# Patient Record
Sex: MALE | Race: WHITE | NOT HISPANIC OR LATINO | Employment: OTHER
[De-identification: names, ages, dates, MRNs, and addresses within clinical notes are randomized per-mention and may not be internally consistent; named-entity substitution may affect disease eponyms.]

---

## 2024-05-02 ENCOUNTER — TRANSCRIBE ORDERS (OUTPATIENT)
Dept: OTHER | Age: 84
End: 2024-05-02

## 2024-05-02 DIAGNOSIS — M70.62 TROCHANTERIC BURSITIS OF LEFT HIP: ICD-10-CM

## 2024-05-02 DIAGNOSIS — M54.50 ACUTE LEFT-SIDED LOW BACK PAIN WITHOUT SCIATICA: ICD-10-CM

## 2024-05-02 DIAGNOSIS — M53.3 SACROCOCCYGEAL DISORDERS, NOT ELSEWHERE CLASSIFIED: Primary | ICD-10-CM

## 2024-05-06 ENCOUNTER — THERAPY VISIT (OUTPATIENT)
Dept: PHYSICAL THERAPY | Facility: CLINIC | Age: 84
End: 2024-05-06
Attending: FAMILY MEDICINE
Payer: COMMERCIAL

## 2024-05-06 DIAGNOSIS — M53.3 SACROCOCCYGEAL DISORDERS, NOT ELSEWHERE CLASSIFIED: Primary | ICD-10-CM

## 2024-05-06 DIAGNOSIS — M70.62 TROCHANTERIC BURSITIS OF LEFT HIP: ICD-10-CM

## 2024-05-06 DIAGNOSIS — M54.50 ACUTE LEFT-SIDED LOW BACK PAIN WITHOUT SCIATICA: ICD-10-CM

## 2024-05-06 PROCEDURE — 97110 THERAPEUTIC EXERCISES: CPT | Mod: GP | Performed by: PHYSICAL THERAPIST

## 2024-05-06 PROCEDURE — 97161 PT EVAL LOW COMPLEX 20 MIN: CPT | Mod: GP | Performed by: PHYSICAL THERAPIST

## 2024-05-06 NOTE — PROGRESS NOTES
"PHYSICAL THERAPY EVALUATION  Type of Visit: Evaluation    See electronic medical record for Abuse and Falls Screening details.    Subjective       Presenting condition or subjective complaint: lower back (muscle) painPt has chronic history of low back pain. Reports that he's very sedentary and that sitting a long time and watching TV has \"taken its toll.\"  About two months ago (3/6/24) noticed more acute pain on the L side with walking. Acquired a cane. Struggling with standing and walking for more than 20-30 seconds. Saw negrita WILKINS with trochanteric bursitis, given prednisone which helped a bit, saw MD and referred to PT with dx of low back/sacral pain.     Date of onset: 03/06/24    Relevant medical history: Diabetes; High blood pressure; Pain at night or rest; Significant weakness (pre-diabetes)   Dates & types of surgery: L knee in 1970s, ablation in 2000s, R eye in 200s    Prior diagnostic imaging/testing results:       Prior therapy history for the same diagnosis, illness or injury: No      Prior Level of Function  Transfers:   Ambulation:   ADL:   IADL:     Living Environment  Social support: With a significant other or spouse   Type of home: House (split entry)   Stairs to enter the home: No       Ramp: No   Stairs inside the home: Yes 7 Is there a railing: Yes   Help at home: None  Equipment owned: Straight Cane     Employment: No retired  Hobbies/Interests:      Patient goals for therapy: walk and stand without pain    Pain assessment: See objective evaluation for additional pain details     Objective   LUMBAR SPINE EVALUATION  PAIN: Pain Level at Rest: 2/10  Pain Level with Use: 7/10  Pain Location: L low back/sacral area, B low back, sometimes to L thigh  Pain Quality: Aching  Pain Frequency: intermittent  Pain is Worst: activity based  Pain is Exacerbated By: standing/walking  Pain is Relieved By: sitting, prednisone  Pain Progression: Worsened  INTEGUMENTARY (edema, incisions):   POSTURE: Standing Posture: " Lordosis decreased  Sitting Posture: Lordosis decreased, Thoracic kyphosis increased  GAIT:   Weightbearing Status:   Assistive Device(s): Cane (single end), (tall cane)  Gait Deviations:   BALANCE/PROPRIOCEPTION:   WEIGHTBEARING ALIGNMENT: Lumbar/Pelvic WB Alignment:Lateral shift R  NON-WEIGHTBEARING ALIGNMENT:    ROM:   (Degrees) Left AROM Left PROM  Right AROM Right PROM   Hip Flexion       Hip Extension       Hip Abduction       Hip Adduction       Hip Internal Rotation       Hip External Rotation       Knee Flexion       Knee Extension       Lumbar Side glide Mod loss with ERP on L nil   Lumbar Flexion Min loss   Lumbar Extension Major loss with ERP on L   Pain:   End feel:     Symptomatic response Mechanical response    During testing After testing Effect - increased ROM, decreased ROM, or key functional test No Effect   Pretest symptoms standing: Mild L LBP     Rep FIS       Rep EIS Peripheralising No Worse       Pretest symptoms lying:     During testing After testing Effect - increased ROM, decreased ROM, or key functional test No Effect   Rep FIsit No Effect No Effect Inc ROM L SGIS after    Rep EIL         If required, pretest symptoms:    During testing After testing Effect - increased ROM, decreased ROM, or key functional test No Effect   Rep SGIS - R       Rep SGIS - L Increases Worse         PELVIC/SI SCREEN:   STRENGTH:     MYOTOMES: WNL  DTR S:   CORD SIGNS:   DERMATOMES:   NEURAL TENSION:   FLEXIBILITY:   LUMBAR/HIP Special Tests:    PELVIS/SI SPECIAL TESTS:   FUNCTIONAL TESTS:   PALPATION:   SPINAL SEGMENTAL CONCLUSIONS:       Assessment & Plan   CLINICAL IMPRESSIONS  Medical Diagnosis: Sacrococcygeal disorders, not elsewhere classified  Trochanteric bursitis of left hip  Acute left-sided low back pain without sciatica    Treatment Diagnosis: B LBP with L radiculopathy   Impression/Assessment: Patient is a 83 year old male with lumbar complaints.  The following significant findings have been  identified: Pain, Decreased ROM/flexibility, Decreased joint mobility, Inflammation, Decreased activity tolerance, and Impaired posture. These impairments interfere with their ability to perform self care tasks, recreational activities, household chores, household mobility, and community mobility as compared to previous level of function.     Clinical Decision Making (Complexity):  Clinical Presentation: Evolving/Changing  Clinical Presentation Rationale: based on medical and personal factors listed in PT evaluation  Clinical Decision Making (Complexity): Low complexity    PLAN OF CARE  Treatment Interventions:  Interventions: Manual Therapy, Neuromuscular Re-education, Therapeutic Activity, Therapeutic Exercise    Long Term Goals     PT Goal 1  Goal Identifier: standing  Goal Description: Pt will be able to stand for 10 min without increased pain  Rationale: to maximize safety and independence with performance of ADLs and functional tasks;to maximize safety and independence within the home;to maximize safety and independence within the community;to maximize safety and independence with self cares  Target Date: 07/15/24      Frequency of Treatment: 2x/wk for 2 wks  Duration of Treatment: then 1x/wk for 8 wks    Recommended Referrals to Other Professionals:   Education Assessment:        Risks and benefits of evaluation/treatment have been explained.   Patient/Family/caregiver agrees with Plan of Care.     Evaluation Time:     PT Eval, Low Complexity Minutes (20011): 25       Signing Clinician: Cristi Wikles PT

## 2024-05-10 ENCOUNTER — THERAPY VISIT (OUTPATIENT)
Dept: PHYSICAL THERAPY | Facility: CLINIC | Age: 84
End: 2024-05-10
Payer: COMMERCIAL

## 2024-05-10 DIAGNOSIS — M70.62 TROCHANTERIC BURSITIS OF LEFT HIP: ICD-10-CM

## 2024-05-10 DIAGNOSIS — M53.3 SACROCOCCYGEAL DISORDERS, NOT ELSEWHERE CLASSIFIED: Primary | ICD-10-CM

## 2024-05-10 DIAGNOSIS — M54.50 ACUTE LEFT-SIDED LOW BACK PAIN WITHOUT SCIATICA: ICD-10-CM

## 2024-05-10 PROCEDURE — 97530 THERAPEUTIC ACTIVITIES: CPT | Mod: GP | Performed by: PHYSICAL THERAPIST

## 2024-05-13 ENCOUNTER — THERAPY VISIT (OUTPATIENT)
Dept: PHYSICAL THERAPY | Facility: CLINIC | Age: 84
End: 2024-05-13
Attending: FAMILY MEDICINE
Payer: COMMERCIAL

## 2024-05-13 DIAGNOSIS — M53.3 SACROCOCCYGEAL DISORDERS, NOT ELSEWHERE CLASSIFIED: Primary | ICD-10-CM

## 2024-05-13 DIAGNOSIS — M54.50 ACUTE LEFT-SIDED LOW BACK PAIN WITHOUT SCIATICA: ICD-10-CM

## 2024-05-13 DIAGNOSIS — M70.62 TROCHANTERIC BURSITIS OF LEFT HIP: ICD-10-CM

## 2024-05-13 PROCEDURE — 97110 THERAPEUTIC EXERCISES: CPT | Mod: GP | Performed by: PHYSICAL THERAPIST

## 2024-05-13 PROCEDURE — 97530 THERAPEUTIC ACTIVITIES: CPT | Mod: GP | Performed by: PHYSICAL THERAPIST

## 2024-05-17 ENCOUNTER — THERAPY VISIT (OUTPATIENT)
Dept: PHYSICAL THERAPY | Facility: CLINIC | Age: 84
End: 2024-05-17
Payer: COMMERCIAL

## 2024-05-17 DIAGNOSIS — M53.3 SACROCOCCYGEAL DISORDERS, NOT ELSEWHERE CLASSIFIED: Primary | ICD-10-CM

## 2024-05-17 DIAGNOSIS — M70.62 TROCHANTERIC BURSITIS OF LEFT HIP: ICD-10-CM

## 2024-05-17 DIAGNOSIS — M54.50 ACUTE LEFT-SIDED LOW BACK PAIN WITHOUT SCIATICA: ICD-10-CM

## 2024-05-17 PROCEDURE — 97110 THERAPEUTIC EXERCISES: CPT | Mod: GP | Performed by: PHYSICAL THERAPIST

## 2024-05-23 ENCOUNTER — THERAPY VISIT (OUTPATIENT)
Dept: PHYSICAL THERAPY | Facility: CLINIC | Age: 84
End: 2024-05-23
Payer: COMMERCIAL

## 2024-05-23 DIAGNOSIS — M70.62 TROCHANTERIC BURSITIS OF LEFT HIP: ICD-10-CM

## 2024-05-23 DIAGNOSIS — M53.3 SACROCOCCYGEAL DISORDERS, NOT ELSEWHERE CLASSIFIED: Primary | ICD-10-CM

## 2024-05-23 DIAGNOSIS — M54.50 ACUTE LEFT-SIDED LOW BACK PAIN WITHOUT SCIATICA: ICD-10-CM

## 2024-05-23 PROCEDURE — 97140 MANUAL THERAPY 1/> REGIONS: CPT | Mod: GP | Performed by: PHYSICAL THERAPIST

## 2024-05-23 PROCEDURE — 97110 THERAPEUTIC EXERCISES: CPT | Mod: GP | Performed by: PHYSICAL THERAPIST

## 2024-07-11 PROBLEM — M53.3 SACROCOCCYGEAL DISORDERS, NOT ELSEWHERE CLASSIFIED: Status: RESOLVED | Noted: 2024-05-06 | Resolved: 2024-07-11

## 2024-07-11 PROBLEM — M70.62 TROCHANTERIC BURSITIS OF LEFT HIP: Status: RESOLVED | Noted: 2024-05-06 | Resolved: 2024-07-11

## 2024-07-11 PROBLEM — M54.50 ACUTE LEFT-SIDED LOW BACK PAIN WITHOUT SCIATICA: Status: RESOLVED | Noted: 2024-05-06 | Resolved: 2024-07-11

## 2024-07-11 NOTE — PROGRESS NOTES
DISCHARGE  Reason for Discharge: Patient has failed to schedule further appointments.    Equipment Issued:     Discharge Plan: Patient to continue home program.   05/23/24 0500   Appointment Info   Signing clinician's name / credentials Kate Mistrythomas PT   Total/Authorized Visits 10 (E&T   Visits Used 5   Medical Diagnosis Sacrococcygeal disorders, not elsewhere classified  Trochanteric bursitis of left hip  Acute left-sided low back pain without sciatica   PT Tx Diagnosis B LBP with L radiculopathy   Other pertinent information Pt's wife present for session   Progress Note/Certification   Start of Care Date 05/06/24   Onset of illness/injury or Date of Surgery 03/06/24   Therapy Frequency 2x/wk for 2 wks   Predicted Duration then 1x/wk for 8 wks   Progress Note Completed Date 05/06/24   PT Goal 1   Goal Identifier standing   Goal Description Pt will be able to stand for 10 min without increased pain   Rationale to maximize safety and independence with performance of ADLs and functional tasks;to maximize safety and independence within the home;to maximize safety and independence within the community;to maximize safety and independence with self cares   Goal Progress goal ongoing- <1 minute before needing to sit   Target Date 07/15/24   Subjective Report   Subjective Report Pt is doing the seated flexion 2x day and tried the new exercise twice- unsure if they help. C/o L hip/buttock pain/LBP with standing and walking. Standing tolerance < 1 min. Less L thigh pain overall.   Objective Measures   Objective Measures Objective Measure 1;Objective Measure 2   Objective Measure 1   Objective Measure lumbar AROM   Details extension- major loss painful central LB, FLX- hands to knees- pain L glut, standing in slight R lateral shift.   Objective Measure 2   Objective Measure L hip PROM   Details mod loss L IR, min loss B ER   Treatment Interventions (PT)   Interventions Therapeutic Procedure/Exercise;Neuromuscular  Re-education;Therapeutic Activity   Therapeutic Procedure/Exercise   Therapeutic Procedures: strength, endurance, ROM, flexibility minutes (21499) 30   Therapeutic Procedures Ther Proc 4;Ther Proc 5;Ther Proc 6   Ther Proc 1 sidelying L hip extension manually   Ther Proc 1 - Details Did not perform today   PTRx Ther Proc 1 Standing Hip Flexor Stretch   PTRx Ther Proc 1 - Details 1 x10=P/NW   PTRx Ther Proc 2 Standing Sideglide   PTRx Ther Proc 2 - Details Did not perform today   PTRx Ther Proc 3 Sitting Flexion   PTRx Ther Proc 3 - Details verbal review to pause unless standing hip extension worsens symptoms   Patient Response/Progress see above for pt repsonse   Ther Proc 5 standing extension   Ther Proc 5 - Details in standing 2x 5 reps( increased LBP during, no worse) instructed in seated lumbar EXT 5 reps( NE during or after) as alternative due to not tolerating standing position - perform 8-10 reps 6-8x daily in either position   Ther Proc 6 clam in R SL   Ther Proc 6 - Details 1x10 reps cueing to maintain neutral trunk position(avoid rotating in LB) pain free   Skilled Intervention see verbal cueing for standing EXT and clam   Manual Therapy   Manual Therapy: Mobilization, MFR, MLD, friction massage minutes (52038) 10   Manual Therapy 1 MFR L piriformis/ GM   Manual Therapy 1 - Details in R SL position   Patient Response/Progress magi well, no post MT soreness in standing   Skilled Intervention mod pressure ,PT piriformis> GM- adjust as pt tolerates.   Plan   Home program see PTRx   Updates to plan of care standing EXT, clam, MT   Plan for next session per response to standing EXT, MT.   Total Session Time   Timed Code Treatment Minutes 40   Total Treatment Time (sum of timed and untimed services) 40         Referring Provider:  Hakeem Carpenter

## 2024-07-28 ENCOUNTER — HEALTH MAINTENANCE LETTER (OUTPATIENT)
Age: 84
End: 2024-07-28

## 2025-08-10 ENCOUNTER — HEALTH MAINTENANCE LETTER (OUTPATIENT)
Age: 85
End: 2025-08-10